# Patient Record
Sex: FEMALE
[De-identification: names, ages, dates, MRNs, and addresses within clinical notes are randomized per-mention and may not be internally consistent; named-entity substitution may affect disease eponyms.]

---

## 2017-12-12 ENCOUNTER — TRANSCRIPTION ENCOUNTER (OUTPATIENT)
Age: 42
End: 2017-12-12

## 2020-04-25 ENCOUNTER — MESSAGE (OUTPATIENT)
Age: 45
End: 2020-04-25

## 2020-05-04 LAB
SARS-COV-2 IGG SERPL IA-ACNC: <0.1 INDEX
SARS-COV-2 IGG SERPL QL IA: NEGATIVE

## 2021-03-25 ENCOUNTER — TRANSCRIPTION ENCOUNTER (OUTPATIENT)
Age: 46
End: 2021-03-25

## 2021-07-22 ENCOUNTER — TRANSCRIPTION ENCOUNTER (OUTPATIENT)
Age: 46
End: 2021-07-22

## 2024-03-07 ENCOUNTER — NON-APPOINTMENT (OUTPATIENT)
Age: 49
End: 2024-03-07

## 2024-08-20 ENCOUNTER — APPOINTMENT (OUTPATIENT)
Dept: BARIATRICS | Facility: CLINIC | Age: 49
End: 2024-08-20
Payer: COMMERCIAL

## 2024-08-20 ENCOUNTER — APPOINTMENT (OUTPATIENT)
Dept: BARIATRICS | Facility: CLINIC | Age: 49
End: 2024-08-20

## 2024-08-20 ENCOUNTER — NON-APPOINTMENT (OUTPATIENT)
Age: 49
End: 2024-08-20

## 2024-08-20 VITALS
OXYGEN SATURATION: 98 % | HEART RATE: 74 BPM | SYSTOLIC BLOOD PRESSURE: 120 MMHG | TEMPERATURE: 97.3 F | DIASTOLIC BLOOD PRESSURE: 84 MMHG | WEIGHT: 227 LBS | HEIGHT: 66.5 IN | BODY MASS INDEX: 36.05 KG/M2

## 2024-08-20 DIAGNOSIS — E56.9 VITAMIN DEFICIENCY, UNSPECIFIED: ICD-10-CM

## 2024-08-20 DIAGNOSIS — Z86.39 PERSONAL HISTORY OF OTHER ENDOCRINE, NUTRITIONAL AND METABOLIC DISEASE: ICD-10-CM

## 2024-08-20 DIAGNOSIS — R79.9 ABNORMAL FINDING OF BLOOD CHEMISTRY, UNSPECIFIED: ICD-10-CM

## 2024-08-20 DIAGNOSIS — R63.2 POLYPHAGIA: ICD-10-CM

## 2024-08-20 DIAGNOSIS — Z01.812 ENCOUNTER FOR PREPROCEDURAL LABORATORY EXAMINATION: ICD-10-CM

## 2024-08-20 DIAGNOSIS — R63.8 OTHER SYMPTOMS AND SIGNS CONCERNING FOOD AND FLUID INTAKE: ICD-10-CM

## 2024-08-20 DIAGNOSIS — Z78.9 OTHER SPECIFIED HEALTH STATUS: ICD-10-CM

## 2024-08-20 DIAGNOSIS — Z00.00 ENCOUNTER FOR GENERAL ADULT MEDICAL EXAMINATION W/OUT ABNORMAL FINDINGS: ICD-10-CM

## 2024-08-20 DIAGNOSIS — E61.8 DEFICIENCY OF OTHER SPECIFIED NUTRIENT ELEMENTS: ICD-10-CM

## 2024-08-20 DIAGNOSIS — E46 UNSPECIFIED PROTEIN-CALORIE MALNUTRITION: ICD-10-CM

## 2024-08-20 DIAGNOSIS — E66.9 OBESITY, UNSPECIFIED: ICD-10-CM

## 2024-08-20 DIAGNOSIS — R63.5 ABNORMAL WEIGHT GAIN: ICD-10-CM

## 2024-08-20 LAB
ALBUMIN SERPL ELPH-MCNC: 4.6 G/DL
ALP BLD-CCNC: 91 U/L
ALT SERPL-CCNC: 19 U/L
ANION GAP SERPL CALC-SCNC: 13 MMOL/L
AST SERPL-CCNC: 15 U/L
BASOPHILS # BLD AUTO: 0.03 K/UL
BASOPHILS NFR BLD AUTO: 0.7 %
BILIRUB SERPL-MCNC: 0.2 MG/DL
BUN SERPL-MCNC: 18 MG/DL
CALCIUM SERPL-MCNC: 9.9 MG/DL
CHLORIDE SERPL-SCNC: 105 MMOL/L
CHOLEST SERPL-MCNC: 191 MG/DL
CO2 SERPL-SCNC: 24 MMOL/L
CREAT SERPL-MCNC: 0.87 MG/DL
EGFR: 81 ML/MIN/1.73M2
EOSINOPHIL # BLD AUTO: 0.19 K/UL
EOSINOPHIL NFR BLD AUTO: 4.1 %
GLUCOSE SERPL-MCNC: 100 MG/DL
HCG SERPL-MCNC: 1 MIU/ML
HCT VFR BLD CALC: 40 %
HDLC SERPL-MCNC: 62 MG/DL
HGB BLD-MCNC: 12.9 G/DL
IMM GRANULOCYTES NFR BLD AUTO: 0.4 %
LDLC SERPL CALC-MCNC: 115 MG/DL
LYMPHOCYTES # BLD AUTO: 1.45 K/UL
LYMPHOCYTES NFR BLD AUTO: 31.7 %
MAN DIFF?: NORMAL
MCHC RBC-ENTMCNC: 30.4 PG
MCHC RBC-ENTMCNC: 32.3 GM/DL
MCV RBC AUTO: 94.3 FL
MONOCYTES # BLD AUTO: 0.33 K/UL
MONOCYTES NFR BLD AUTO: 7.2 %
NEUTROPHILS # BLD AUTO: 2.56 K/UL
NEUTROPHILS NFR BLD AUTO: 55.9 %
NONHDLC SERPL-MCNC: 129 MG/DL
PLATELET # BLD AUTO: 252 K/UL
POTASSIUM SERPL-SCNC: 5.4 MMOL/L
PROT SERPL-MCNC: 7.2 G/DL
RBC # BLD: 4.24 M/UL
RBC # FLD: 12.8 %
SODIUM SERPL-SCNC: 143 MMOL/L
TRIGL SERPL-MCNC: 79 MG/DL
TSH SERPL-ACNC: 2.22 UIU/ML
WBC # FLD AUTO: 4.58 K/UL

## 2024-08-20 PROCEDURE — 99205 OFFICE O/P NEW HI 60 MIN: CPT

## 2024-08-20 RX ORDER — LORATADINE 5 MG/5 ML
SOLUTION, ORAL ORAL DAILY
Refills: 0 | Status: ACTIVE | COMMUNITY

## 2024-08-20 NOTE — PHYSICAL EXAM
[Obese, well nourished, in no acute distress] : obese, well nourished, in no acute distress [Normal] : affect appropriate [de-identified] : soft, NT, ND, no evidence of hernia or diastasis; obese

## 2024-08-20 NOTE — HISTORY OF PRESENT ILLNESS
[de-identified] : ANTIONETTE ASIF is a 49-year-old F with a long-standing Hx of obesity who presents today for initial evaluation for weight management options.   Heaviest/current wt 227 lbs/227 lbs, lowest wt 160 lbs. Diets/exercise programs tried in the past: yes (e.g. WW) Weight loss medications tried in the past: none   PMH and FH of: pancreatitis: no gallstones: no kidney stones: yes (father) MEN syndrome: no Medullary thyroid cancer: no Anxiety: no Glaucoma: no Ophthalmological Contraindications: no Currently taking narcotic pain medication(s) or suboxone: no   History of bariatric surgery: no Obesity comorbidities: none Anti-obesity medication: none   Current dietary lifestyle: Breakfast: coffee with creamer Lunch: yogurt/kashi  Dinner: At 5:30-7 PM: protein (chicken, fish, beef), with vegetables and a carbohydrate Snacks: After dinner: chips Drinks: water, coffee (up to 2 cups/day with Half & Half)   Activity Lifestyle: Sleep: 6-8 hrs/night Physical activity/exercise: daily activity Work: respiratory therapist Smoking/EtOH: nonsmoker; no EtOH use   Females of Childbearing Age: Plans for future pregnancy: no Currently breastfeeding: no   Last Mammogram: 2022

## 2024-08-20 NOTE — REVIEW OF SYSTEMS
[Patient Intake Form Reviewed] : Patient intake form was reviewed [Fatigue] : fatigue [Negative] : Allergic/Immunologic [Fever] : no fever [Chills] : no chills [Night Sweats] : no night sweats

## 2024-08-20 NOTE — ASSESSMENT
[FreeTextEntry1] : ANTIONETTE ASIF is a 49-year-old F with a long-standing h/o obesity, who presents today for initial evaluation for weight management options.   Had a lengthy discussion with the patient regarding nutrition, exercise, weight loss medications, and bariatric surgery. Discussed how bariatric surgery may offer greater weight loss results with better long-term success. The patient qualifies for and is most interested in weight loss medications.   Health maintenance and behavioral/nutrition counseling for obesity: An additional 30 minutes of the visit was spent counseling the patient regarding need for aggressive weight loss and behavior modification including nutrition and proper eating habits, including which foods to eat and avoid.   Emphasized the importance of making healthier food choices including fresh fruits and vegetables, lean meats, and protein sources. Recommended front loading calories, incorporating whole grains, and eliminating fast foods. I also discussed the importance of avoiding fried/fatty foods and foods containing high sugar content including juices/shakes/sodas/desserts.   Also encouraged beginning an exercise program and recommended cardiovascular exercises along with strength training to build lean muscle. Made suggestions on different types of exercises to try.   Patient will work on the following:  -Eliminate after dinner snacking (consider drinking water instead; can add fruit like Watermelon, Cucumber, Lemon    to water)   -Increase water consumption; aim for 64 oz water/day, and an additional 2 cups of water for every cup of   caffeinated beverage   -Limit liquid calories   -Focus on eating 3 well-balanced meals with lean protein (aim for 60-70 g protein) and fiber during the daytime    with appropriate portion size  -Frontload calories earlier in the day (i.e. avoid skipping meals); consider eating Greek yogurt with berries, cottage    cheese with berries, or two eggs for breakfast; can also consider protein shake as meal replacement (limit to one    per day)  -Try to make vegetables the largest portion of each meal, followed by a lean protein (e.g. lentils, chicken, fish), and    finally a complex carbohydrate if still hungry (e.g. sweet potato, farro, quinoa)   -Cooking fresh meals rather than take out/processed/ready-made foods  -Incorporating exercise (aim for 150 mins/week with both cardio- and strength-training) -Use a step tracker   Start Zepbound based on authorization and labs (ordered). Currently there are no contraindications for the use of Zepbound after reviewing the pt's medical history and labs, including personal and family history of thyroid cancer or MEN 2 Syndrome. Possible side effects of Zepbound were discussed with the patient, including nausea, abdominal pain, reflux, constipation, delayed gastric emptying, gallstones, kidney stones, and pancreatitis.  Pt verbalizes understanding and wishes to proceed with medical therapy. Instructions for use provided. Pt understands the need for long-term use and understands the risk of weight-gain upon stopping weight loss medications.   Patient educated to call with questions/concerns All questions answered Return to office 6 weeks after starting Zepbound; call the office right away with any side effects   Discussed with the pt of the warnings of pregnancy while on Zepbound:  - instructed pt to avoid planned pregnancy and use contraception  - advised pt to stop Zepbound immediately if becomes pregnant   Pt verbalized understanding of the above   Additional time spent before and after visit reviewing chart.

## 2024-08-21 LAB
ESTIMATED AVERAGE GLUCOSE: 108 MG/DL
HBA1C MFR BLD HPLC: 5.4 %

## 2024-08-21 NOTE — ASSESSMENT
[FreeTextEntry1] : ANTIONETTE SAIF is a 49-year-old F with a long-standing h/o obesity, who presents today for initial evaluation for weight management options.   Had a lengthy discussion with the patient regarding nutrition, exercise, weight loss medications, and bariatric surgery. Discussed how bariatric surgery may offer greater weight loss results with better long-term success. The patient qualifies for and is most interested in weight loss medications.   Health maintenance and behavioral/nutrition counseling for obesity: An additional 30 minutes of the visit was spent counseling the patient regarding need for aggressive weight loss and behavior modification including nutrition and proper eating habits, including which foods to eat and avoid.   Emphasized the importance of making healthier food choices including fresh fruits and vegetables, lean meats, and protein sources. Recommended front loading calories, incorporating whole grains, and eliminating fast foods. I also discussed the importance of avoiding fried/fatty foods and foods containing high sugar content including juices/shakes/sodas/desserts.   Also encouraged beginning an exercise program and recommended cardiovascular exercises along with strength training to build lean muscle. Made suggestions on different types of exercises to try.   Patient will work on the following:  -Eliminate after dinner snacking (consider drinking water instead; can add fruit like Watermelon, Cucumber, Lemon    to water)   -Increase water consumption; aim for 64 oz water/day, and an additional 2 cups of water for every cup of   caffeinated beverage   -Limit liquid calories   -Focus on eating 3 well-balanced meals with lean protein (aim for 60-70 g protein) and fiber during the daytime    with appropriate portion size  -Frontload calories earlier in the day (i.e. avoid skipping meals); consider eating Greek yogurt with berries, cottage    cheese with berries, or two eggs for breakfast; can also consider protein shake as meal replacement (limit to one    per day)  -Try to make vegetables the largest portion of each meal, followed by a lean protein (e.g. lentils, chicken, fish), and    finally a complex carbohydrate if still hungry (e.g. sweet potato, farro, quinoa)   -Cooking fresh meals rather than take out/processed/ready-made foods  -Incorporating exercise (aim for 150 mins/week with both cardio- and strength-training) -Use a step tracker   Start Zepbound based on authorization and labs (ordered). Currently there are no contraindications for the use of Zepbound after reviewing the pt's medical history and labs, including personal and family history of thyroid cancer or MEN 2 Syndrome. Possible side effects of Zepbound were discussed with the patient, including nausea, abdominal pain, reflux, constipation, delayed gastric emptying, gallstones, kidney stones, and pancreatitis.  Pt verbalizes understanding and wishes to proceed with medical therapy. Instructions for use provided. Pt understands the need for long-term use and understands the risk of weight-gain upon stopping weight loss medications.   Patient educated to call with questions/concerns All questions answered Return to office 6 weeks after starting Zepbound; call the office right away with any side effects   Discussed with the pt of the warnings of pregnancy while on Zepbound:  - instructed pt to avoid planned pregnancy and use contraception  - advised pt to stop Zepbound immediately if becomes pregnant   Pt verbalized understanding of the above   Additional time spent before and after visit reviewing chart.

## 2024-08-21 NOTE — HISTORY OF PRESENT ILLNESS
[de-identified] : ANTIONETTE ASIF is a 49-year-old F with a long-standing Hx of obesity who presents today for initial evaluation for weight management options.   Heaviest/current wt 227 lbs/227 lbs, lowest wt 160 lbs. Diets/exercise programs tried in the past: yes (e.g. WW) Weight loss medications tried in the past: none   PMH and FH of: pancreatitis: no gallstones: no kidney stones: yes (father) MEN syndrome: no Medullary thyroid cancer: no Anxiety: no Glaucoma: no Ophthalmological Contraindications: no Currently taking narcotic pain medication(s) or suboxone: no   History of bariatric surgery: no Obesity comorbidities: none Anti-obesity medication: none   Current dietary lifestyle: Breakfast: coffee with creamer Lunch: yogurt/kashi  Dinner: At 5:30-7 PM: protein (chicken, fish, beef), with vegetables and a carbohydrate Snacks: After dinner: chips Drinks: water, coffee (up to 2 cups/day with Half & Half)   Activity Lifestyle: Sleep: 6-8 hrs/night Physical activity/exercise: daily activity Work: respiratory therapist Smoking/EtOH: nonsmoker; no EtOH use   Females of Childbearing Age: Plans for future pregnancy: no Currently breastfeeding: no   Last Mammogram: 2022

## 2024-08-21 NOTE — PHYSICAL EXAM
[Obese, well nourished, in no acute distress] : obese, well nourished, in no acute distress [Normal] : affect appropriate [de-identified] : soft, NT, ND, no evidence of hernia or diastasis; obese

## 2024-08-21 NOTE — HISTORY OF PRESENT ILLNESS
[de-identified] : ANTIONETTE ASIF is a 49-year-old F with a long-standing Hx of obesity who presents today for initial evaluation for weight management options.   Heaviest/current wt 227 lbs/227 lbs, lowest wt 160 lbs. Diets/exercise programs tried in the past: yes (e.g. WW) Weight loss medications tried in the past: none   PMH and FH of: pancreatitis: no gallstones: no kidney stones: yes (father) MEN syndrome: no Medullary thyroid cancer: no Anxiety: no Glaucoma: no Ophthalmological Contraindications: no Currently taking narcotic pain medication(s) or suboxone: no   History of bariatric surgery: no Obesity comorbidities: none Anti-obesity medication: none   Current dietary lifestyle: Breakfast: coffee with creamer Lunch: yogurt/kashi  Dinner: At 5:30-7 PM: protein (chicken, fish, beef), with vegetables and a carbohydrate Snacks: After dinner: chips Drinks: water, coffee (up to 2 cups/day with Half & Half)   Activity Lifestyle: Sleep: 6-8 hrs/night Physical activity/exercise: daily activity Work: respiratory therapist Smoking/EtOH: nonsmoker; no EtOH use   Females of Childbearing Age: Plans for future pregnancy: no Currently breastfeeding: no   Last Mammogram: 2022

## 2024-08-21 NOTE — PHYSICAL EXAM
[Obese, well nourished, in no acute distress] : obese, well nourished, in no acute distress [Normal] : affect appropriate [de-identified] : soft, NT, ND, no evidence of hernia or diastasis; obese

## 2024-08-23 RX ORDER — TIRZEPATIDE 2.5 MG/.5ML
2.5 INJECTION, SOLUTION SUBCUTANEOUS
Qty: 1 | Refills: 0 | Status: ACTIVE | COMMUNITY
Start: 2024-08-23 | End: 1900-01-01

## 2024-10-11 ENCOUNTER — APPOINTMENT (OUTPATIENT)
Dept: BARIATRICS | Facility: CLINIC | Age: 49
End: 2024-10-11
Payer: COMMERCIAL

## 2024-10-11 VITALS
BODY MASS INDEX: 34.17 KG/M2 | OXYGEN SATURATION: 97 % | DIASTOLIC BLOOD PRESSURE: 72 MMHG | SYSTOLIC BLOOD PRESSURE: 128 MMHG | TEMPERATURE: 98.1 F | WEIGHT: 215.17 LBS | HEART RATE: 67 BPM | HEIGHT: 66.5 IN

## 2024-10-11 DIAGNOSIS — E66.9 OBESITY, UNSPECIFIED: ICD-10-CM

## 2024-10-11 DIAGNOSIS — R63.4 ABNORMAL WEIGHT LOSS: ICD-10-CM

## 2024-10-11 PROCEDURE — 99214 OFFICE O/P EST MOD 30 MIN: CPT

## 2024-10-11 RX ORDER — URSODIOL 300 MG/1
300 CAPSULE ORAL
Qty: 90 | Refills: 1 | Status: ACTIVE | COMMUNITY
Start: 2024-10-11 | End: 1900-01-01

## 2024-11-20 ENCOUNTER — APPOINTMENT (OUTPATIENT)
Dept: BARIATRICS | Facility: CLINIC | Age: 49
End: 2024-11-20
Payer: COMMERCIAL

## 2024-11-20 VITALS
DIASTOLIC BLOOD PRESSURE: 74 MMHG | SYSTOLIC BLOOD PRESSURE: 118 MMHG | BODY MASS INDEX: 32.81 KG/M2 | TEMPERATURE: 98.2 F | OXYGEN SATURATION: 98 % | HEART RATE: 90 BPM | WEIGHT: 206.57 LBS | HEIGHT: 66.5 IN

## 2024-11-20 DIAGNOSIS — R63.4 ABNORMAL WEIGHT LOSS: ICD-10-CM

## 2024-11-20 DIAGNOSIS — Z79.899 OTHER LONG TERM (CURRENT) DRUG THERAPY: ICD-10-CM

## 2024-11-20 DIAGNOSIS — E66.9 OBESITY, UNSPECIFIED: ICD-10-CM

## 2024-11-20 PROCEDURE — 99214 OFFICE O/P EST MOD 30 MIN: CPT

## 2025-01-22 ENCOUNTER — APPOINTMENT (OUTPATIENT)
Dept: BARIATRICS | Facility: CLINIC | Age: 50
End: 2025-01-22
Payer: COMMERCIAL

## 2025-01-22 VITALS
TEMPERATURE: 97.7 F | BODY MASS INDEX: 31.62 KG/M2 | SYSTOLIC BLOOD PRESSURE: 112 MMHG | OXYGEN SATURATION: 98 % | WEIGHT: 199.13 LBS | HEART RATE: 79 BPM | DIASTOLIC BLOOD PRESSURE: 72 MMHG | HEIGHT: 66.5 IN

## 2025-01-22 DIAGNOSIS — E56.9 VITAMIN DEFICIENCY, UNSPECIFIED: ICD-10-CM

## 2025-01-22 DIAGNOSIS — R63.8 OTHER SYMPTOMS AND SIGNS CONCERNING FOOD AND FLUID INTAKE: ICD-10-CM

## 2025-01-22 DIAGNOSIS — E46 UNSPECIFIED PROTEIN-CALORIE MALNUTRITION: ICD-10-CM

## 2025-01-22 DIAGNOSIS — R63.2 POLYPHAGIA: ICD-10-CM

## 2025-01-22 DIAGNOSIS — Z79.899 OTHER LONG TERM (CURRENT) DRUG THERAPY: ICD-10-CM

## 2025-01-22 DIAGNOSIS — R63.5 ABNORMAL WEIGHT GAIN: ICD-10-CM

## 2025-01-22 DIAGNOSIS — R79.9 ABNORMAL FINDING OF BLOOD CHEMISTRY, UNSPECIFIED: ICD-10-CM

## 2025-01-22 DIAGNOSIS — Z86.39 PERSONAL HISTORY OF OTHER ENDOCRINE, NUTRITIONAL AND METABOLIC DISEASE: ICD-10-CM

## 2025-01-22 DIAGNOSIS — Z00.00 ENCOUNTER FOR GENERAL ADULT MEDICAL EXAMINATION W/OUT ABNORMAL FINDINGS: ICD-10-CM

## 2025-01-22 DIAGNOSIS — E61.8 DEFICIENCY OF OTHER SPECIFIED NUTRIENT ELEMENTS: ICD-10-CM

## 2025-01-22 DIAGNOSIS — Z01.812 ENCOUNTER FOR PREPROCEDURAL LABORATORY EXAMINATION: ICD-10-CM

## 2025-01-22 DIAGNOSIS — E66.9 OBESITY, UNSPECIFIED: ICD-10-CM

## 2025-01-22 PROCEDURE — 99214 OFFICE O/P EST MOD 30 MIN: CPT

## 2025-03-17 ENCOUNTER — APPOINTMENT (OUTPATIENT)
Dept: BARIATRICS | Facility: CLINIC | Age: 50
End: 2025-03-17
Payer: COMMERCIAL

## 2025-03-17 VITALS
TEMPERATURE: 98.1 F | SYSTOLIC BLOOD PRESSURE: 116 MMHG | BODY MASS INDEX: 30.53 KG/M2 | DIASTOLIC BLOOD PRESSURE: 76 MMHG | HEART RATE: 79 BPM | OXYGEN SATURATION: 96 % | HEIGHT: 66.5 IN | WEIGHT: 192.24 LBS

## 2025-03-17 DIAGNOSIS — E66.9 OBESITY, UNSPECIFIED: ICD-10-CM

## 2025-03-17 PROCEDURE — 99214 OFFICE O/P EST MOD 30 MIN: CPT

## 2025-03-18 RX ORDER — TIRZEPATIDE 10 MG/.5ML
10 INJECTION, SOLUTION SUBCUTANEOUS
Qty: 12 | Refills: 0 | Status: ACTIVE | COMMUNITY
Start: 2025-03-18 | End: 1900-01-01

## 2025-03-26 ENCOUNTER — TRANSCRIPTION ENCOUNTER (OUTPATIENT)
Age: 50
End: 2025-03-26

## 2025-05-08 ENCOUNTER — TRANSCRIPTION ENCOUNTER (OUTPATIENT)
Age: 50
End: 2025-05-08

## 2025-06-16 ENCOUNTER — APPOINTMENT (OUTPATIENT)
Dept: BARIATRICS | Facility: CLINIC | Age: 50
End: 2025-06-16
Payer: COMMERCIAL

## 2025-06-16 ENCOUNTER — NON-APPOINTMENT (OUTPATIENT)
Age: 50
End: 2025-06-16

## 2025-06-16 VITALS
HEART RATE: 80 BPM | SYSTOLIC BLOOD PRESSURE: 118 MMHG | BODY MASS INDEX: 29.41 KG/M2 | HEIGHT: 66.5 IN | TEMPERATURE: 97.7 F | WEIGHT: 185.19 LBS | DIASTOLIC BLOOD PRESSURE: 77 MMHG | OXYGEN SATURATION: 99 %

## 2025-06-16 PROBLEM — Z71.3 NUTRITIONAL COUNSELING: Status: ACTIVE | Noted: 2025-06-16

## 2025-06-16 PROCEDURE — 99214 OFFICE O/P EST MOD 30 MIN: CPT

## 2025-06-16 PROCEDURE — G2211 COMPLEX E/M VISIT ADD ON: CPT

## 2025-06-19 NOTE — ASSESSMENT
Thank you very much for this referral. Please contact me if you have any questions or concerns regarding the plan of care.  Cristina Mera MD   [FreeTextEntry1] : ANTIONETTE ASIF is a 49-year-old F with a long-standing h/o obesity, who presents today for initial evaluation for weight management options.   Had a lengthy discussion with the patient regarding nutrition, exercise, weight loss medications, and bariatric surgery. Discussed how bariatric surgery may offer greater weight loss results with better long-term success. The patient qualifies for and is most interested in weight loss medications.   Health maintenance and behavioral/nutrition counseling for obesity: An additional 30 minutes of the visit was spent counseling the patient regarding need for aggressive weight loss and behavior modification including nutrition and proper eating habits, including which foods to eat and avoid.   Emphasized the importance of making healthier food choices including fresh fruits and vegetables, lean meats, and protein sources. Recommended front loading calories, incorporating whole grains, and eliminating fast foods. I also discussed the importance of avoiding fried/fatty foods and foods containing high sugar content including juices/shakes/sodas/desserts.   Also encouraged beginning an exercise program and recommended cardiovascular exercises along with strength training to build lean muscle. Made suggestions on different types of exercises to try.   Patient will work on the following:  -Eliminate after dinner snacking (consider drinking water instead; can add fruit like Watermelon, Cucumber, Lemon    to water)   -Increase water consumption; aim for 64 oz water/day, and an additional 2 cups of water for every cup of   caffeinated beverage   -Limit liquid calories   -Focus on eating 3 well-balanced meals with lean protein (aim for 60-70 g protein) and fiber during the daytime    with appropriate portion size  -Frontload calories earlier in the day (i.e. avoid skipping meals); consider eating Greek yogurt with berries, cottage    cheese with berries, or two eggs for breakfast; can also consider protein shake as meal replacement (limit to one    per day)  -Try to make vegetables the largest portion of each meal, followed by a lean protein (e.g. lentils, chicken, fish), and    finally a complex carbohydrate if still hungry (e.g. sweet potato, farro, quinoa)   -Cooking fresh meals rather than take out/processed/ready-made foods  -Incorporating exercise (aim for 150 mins/week with both cardio- and strength-training) -Use a step tracker   Start Zepbound based on authorization and labs (ordered). Currently there are no contraindications for the use of Zepbound after reviewing the pt's medical history and labs, including personal and family history of thyroid cancer or MEN 2 Syndrome. Possible side effects of Zepbound were discussed with the patient, including nausea, abdominal pain, reflux, constipation, delayed gastric emptying, gallstones, kidney stones, and pancreatitis.  Pt verbalizes understanding and wishes to proceed with medical therapy. Instructions for use provided. Pt understands the need for long-term use and understands the risk of weight-gain upon stopping weight loss medications.   Patient educated to call with questions/concerns All questions answered Return to office 6 weeks after starting Zepbound; call the office right away with any side effects   Discussed with the pt of the warnings of pregnancy while on Zepbound:  - instructed pt to avoid planned pregnancy and use contraception  - advised pt to stop Zepbound immediately if becomes pregnant   Pt verbalized understanding of the above   Additional time spent before and after visit reviewing chart.

## 2025-09-11 ENCOUNTER — APPOINTMENT (OUTPATIENT)
Dept: BARIATRICS | Facility: CLINIC | Age: 50
End: 2025-09-11
Payer: COMMERCIAL

## 2025-09-11 VITALS
HEIGHT: 66.5 IN | TEMPERATURE: 98.2 F | SYSTOLIC BLOOD PRESSURE: 124 MMHG | DIASTOLIC BLOOD PRESSURE: 71 MMHG | HEART RATE: 71 BPM | WEIGHT: 181.22 LBS | OXYGEN SATURATION: 96 % | BODY MASS INDEX: 28.78 KG/M2

## 2025-09-11 DIAGNOSIS — E66.3 OVERWEIGHT: ICD-10-CM

## 2025-09-11 DIAGNOSIS — Z76.0 ENCOUNTER FOR ISSUE OF REPEAT PRESCRIPTION: ICD-10-CM

## 2025-09-11 DIAGNOSIS — Z76.89 PERSONS ENCOUNTERING HEALTH SERVICES IN OTHER SPECIFIED CIRCUMSTANCES: ICD-10-CM

## 2025-09-11 DIAGNOSIS — Z13.29 ENCOUNTER FOR SCREENING FOR OTHER SUSPECTED ENDOCRINE DISORDER: ICD-10-CM

## 2025-09-11 DIAGNOSIS — Z71.82 EXERCISE COUNSELING: ICD-10-CM

## 2025-09-11 DIAGNOSIS — Z13.220 ENCOUNTER FOR SCREENING FOR LIPOID DISORDERS: ICD-10-CM

## 2025-09-11 DIAGNOSIS — R63.4 ABNORMAL WEIGHT LOSS: ICD-10-CM

## 2025-09-11 DIAGNOSIS — Z79.899 OTHER LONG TERM (CURRENT) DRUG THERAPY: ICD-10-CM

## 2025-09-11 PROCEDURE — 99214 OFFICE O/P EST MOD 30 MIN: CPT

## 2025-09-11 PROCEDURE — G2211 COMPLEX E/M VISIT ADD ON: CPT
